# Patient Record
Sex: MALE | Race: NATIVE HAWAIIAN OR OTHER PACIFIC ISLANDER | HISPANIC OR LATINO
[De-identification: names, ages, dates, MRNs, and addresses within clinical notes are randomized per-mention and may not be internally consistent; named-entity substitution may affect disease eponyms.]

---

## 2020-05-28 ENCOUNTER — APPOINTMENT (OUTPATIENT)
Dept: UROLOGY | Facility: CLINIC | Age: 50
End: 2020-05-28
Payer: SELF-PAY

## 2020-05-28 VITALS
HEART RATE: 63 BPM | BODY MASS INDEX: 27.72 KG/M2 | DIASTOLIC BLOOD PRESSURE: 84 MMHG | HEIGHT: 71 IN | WEIGHT: 198 LBS | SYSTOLIC BLOOD PRESSURE: 143 MMHG | OXYGEN SATURATION: 97 %

## 2020-05-28 DIAGNOSIS — Z80.1 FAMILY HISTORY OF MALIGNANT NEOPLASM OF TRACHEA, BRONCHUS AND LUNG: ICD-10-CM

## 2020-05-28 DIAGNOSIS — Z72.0 TOBACCO USE: ICD-10-CM

## 2020-05-28 DIAGNOSIS — Z86.69 PERSONAL HISTORY OF OTHER DISEASES OF THE NERVOUS SYSTEM AND SENSE ORGANS: ICD-10-CM

## 2020-05-28 PROCEDURE — 99204 OFFICE O/P NEW MOD 45 MIN: CPT

## 2020-05-28 NOTE — PHYSICAL EXAM
[General Appearance - Well Developed] : well developed [Normal Appearance] : normal appearance [Well Groomed] : well groomed [General Appearance - Well Nourished] : well nourished [General Appearance - In No Acute Distress] : no acute distress [Edema] : no peripheral edema [Exaggerated Use Of Accessory Muscles For Inspiration] : no accessory muscle use [Respiration, Rhythm And Depth] : normal respiratory rhythm and effort [Costovertebral Angle Tenderness] : no ~M costovertebral angle tenderness [Abdomen Soft] : soft [Abdomen Tenderness] : non-tender [Urinary Bladder Findings] : the bladder was normal on palpation [Urethral Meatus] : meatus normal [Testes Mass (___cm)] : there were no testicular masses [No Prostate Nodules] : no prostate nodules [Scrotum] : the scrotum was normal [FreeTextEntry1] : 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  Prostate is small and palpably benign [Normal Station and Gait] : the gait and station were normal for the patient's age [] : no rash [Oriented To Time, Place, And Person] : oriented to person, place, and time [No Focal Deficits] : no focal deficits [Affect] : the affect was normal [Not Anxious] : not anxious [Mood] : the mood was normal [No Palpable Adenopathy] : no palpable adenopathy

## 2020-05-28 NOTE — HISTORY OF PRESENT ILLNESS
[FreeTextEntry1] : This patient is here with his wife.  They state that he has had difficulty maintaining an erection for elective time for the past 5 years.  He does not ejaculate prior to the lessening of the erection.  More recently his penis is started to deviate to the left with erection with some pain noted.  He denies other urinary tract problems.

## 2020-05-28 NOTE — END OF VISIT
[FreeTextEntry3] : Labs are sent and he will followup with a DUPLEX scan of the penis.  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

## 2020-06-09 ENCOUNTER — APPOINTMENT (OUTPATIENT)
Dept: UROLOGY | Facility: CLINIC | Age: 50
End: 2020-06-09

## 2020-07-20 ENCOUNTER — APPOINTMENT (OUTPATIENT)
Dept: NEUROLOGY | Facility: CLINIC | Age: 50
End: 2020-07-20

## 2020-10-12 ENCOUNTER — APPOINTMENT (OUTPATIENT)
Dept: OTOLARYNGOLOGY | Facility: CLINIC | Age: 50
End: 2020-10-12
Payer: MEDICAID

## 2020-10-12 VITALS
BODY MASS INDEX: 27.09 KG/M2 | DIASTOLIC BLOOD PRESSURE: 78 MMHG | HEIGHT: 72 IN | WEIGHT: 200 LBS | SYSTOLIC BLOOD PRESSURE: 142 MMHG | HEART RATE: 63 BPM | TEMPERATURE: 97.6 F

## 2020-10-12 DIAGNOSIS — H90.3 SENSORINEURAL HEARING LOSS, BILATERAL: ICD-10-CM

## 2020-10-12 DIAGNOSIS — F17.210 NICOTINE DEPENDENCE, CIGARETTES, UNCOMPLICATED: ICD-10-CM

## 2020-10-12 DIAGNOSIS — H92.01 OTALGIA, RIGHT EAR: ICD-10-CM

## 2020-10-12 DIAGNOSIS — Z83.3 FAMILY HISTORY OF DIABETES MELLITUS: ICD-10-CM

## 2020-10-12 DIAGNOSIS — M26.609 UNSPECIFIED TEMPOROMANDIBULAR JOINT DISORDER: ICD-10-CM

## 2020-10-12 DIAGNOSIS — E78.00 PURE HYPERCHOLESTEROLEMIA, UNSPECIFIED: ICD-10-CM

## 2020-10-12 PROCEDURE — 99204 OFFICE O/P NEW MOD 45 MIN: CPT | Mod: 25

## 2020-10-12 PROCEDURE — 92567 TYMPANOMETRY: CPT

## 2020-10-12 PROCEDURE — 92557 COMPREHENSIVE HEARING TEST: CPT

## 2020-10-12 RX ORDER — ESCITALOPRAM OXALATE 10 MG/1
10 TABLET ORAL
Refills: 0 | Status: ACTIVE | COMMUNITY

## 2020-10-12 RX ORDER — CIPROFLOXACIN HYDROCHLORIDE 500 MG/1
500 TABLET, FILM COATED ORAL
Refills: 0 | Status: ACTIVE | COMMUNITY

## 2020-10-12 NOTE — REVIEW OF SYSTEMS
[Dizziness] : dizziness [Vertigo] : vertigo [Lightheadedness] : lightheadedness [Recurrent Sinus Infections] : recurrent sinus infections [Snoring With Pauses] : snoring with pauses [Throat Dryness] : throat dryness [Throat Itching] : throat itching [Anxiety] : anxiety [Negative] : Heme/Lymph [FreeTextEntry1] : headache  chills  reflux  passing out   sweating at night

## 2020-10-12 NOTE — PHYSICAL EXAM
[Midline] : trachea located in midline position [Edentulous] : edentulous [Normal] : no rashes [de-identified] : tender right tmj  [de-identified] : normal after removal of denture

## 2020-10-12 NOTE — HISTORY OF PRESENT ILLNESS
[de-identified] : c/o left ear ache last week - now c/o right ear discomfort.  No prior evaluation. No prior ear problems.  Feels pain in back of head.  ? decreased hearing.  Does use q tips.

## 2020-10-12 NOTE — ASSESSMENT
[FreeTextEntry1] : Patient with right otalgia and tender right tmj.  Ear exam unremarkable.  Patient with bilateral snhl and a tymp and only wears left hearing aide.  Feel problem with pain due to tmj and recommended warm compresses and NSAIDS.  Patient also advised that based on audio he should consider bilateral hearing aides.  Follow up in several mos and as necessary

## 2020-10-13 ENCOUNTER — APPOINTMENT (OUTPATIENT)
Dept: UROLOGY | Facility: CLINIC | Age: 50
End: 2020-10-13
Payer: MEDICAID

## 2020-10-27 ENCOUNTER — APPOINTMENT (OUTPATIENT)
Dept: UROLOGY | Facility: CLINIC | Age: 50
End: 2020-10-27
Payer: MEDICAID

## 2020-10-27 VITALS
DIASTOLIC BLOOD PRESSURE: 80 MMHG | SYSTOLIC BLOOD PRESSURE: 147 MMHG | BODY MASS INDEX: 27.77 KG/M2 | HEIGHT: 72 IN | WEIGHT: 205 LBS | OXYGEN SATURATION: 98 % | HEART RATE: 64 BPM | TEMPERATURE: 97.6 F

## 2020-10-27 PROCEDURE — 99213 OFFICE O/P EST LOW 20 MIN: CPT

## 2020-10-27 PROCEDURE — 99072 ADDL SUPL MATRL&STAF TM PHE: CPT

## 2020-10-27 RX ORDER — CIPROFLOXACIN HYDROCHLORIDE 500 MG/1
500 TABLET, FILM COATED ORAL
Qty: 20 | Refills: 0 | Status: ACTIVE | COMMUNITY
Start: 2020-10-27 | End: 1900-01-01

## 2020-10-27 NOTE — HISTORY OF PRESENT ILLNESS
[FreeTextEntry1] : This patient is here with his spouse.  His private doctor sent him over with a PSA elevation.  He has not had numbers to this level in the past.  He has some mild lower tract symptoms

## 2020-10-27 NOTE — PHYSICAL EXAM
[General Appearance - Well Developed] : well developed [General Appearance - Well Nourished] : well nourished [Normal Appearance] : normal appearance [Well Groomed] : well groomed [General Appearance - In No Acute Distress] : no acute distress [Abdomen Soft] : soft [Abdomen Tenderness] : non-tender [Costovertebral Angle Tenderness] : no ~M costovertebral angle tenderness [Urethral Meatus] : meatus normal [Urinary Bladder Findings] : the bladder was normal on palpation [Scrotum] : the scrotum was normal [Testes Mass (___cm)] : there were no testicular masses [No Prostate Nodules] : no prostate nodules [FreeTextEntry1] : 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  Prostate is small and palpably benign [Edema] : no peripheral edema [] : no respiratory distress [Respiration, Rhythm And Depth] : normal respiratory rhythm and effort [Exaggerated Use Of Accessory Muscles For Inspiration] : no accessory muscle use [Oriented To Time, Place, And Person] : oriented to person, place, and time [Affect] : the affect was normal [Mood] : the mood was normal [Not Anxious] : not anxious [Normal Station and Gait] : the gait and station were normal for the patient's age [No Focal Deficits] : no focal deficits [No Palpable Adenopathy] : no palpable adenopathy

## 2020-10-27 NOTE — END OF VISIT
[FreeTextEntry3] : He will take a course of ciprofloxacin with a repeat PSA and a follow-up transrectal ultrasound of the prostate gland in 3 weeks

## 2020-11-17 ENCOUNTER — APPOINTMENT (OUTPATIENT)
Dept: UROLOGY | Facility: CLINIC | Age: 50
End: 2020-11-17
Payer: MEDICAID

## 2020-11-17 DIAGNOSIS — R97.20 ELEVATED PROSTATE, SPECIFIC ANTIGEN [PSA]: ICD-10-CM

## 2020-11-17 DIAGNOSIS — N40.0 BENIGN PROSTATIC HYPERPLASIA WITHOUT LOWER URINARY TRACT SYMPMS: ICD-10-CM

## 2020-11-17 PROCEDURE — 99213 OFFICE O/P EST LOW 20 MIN: CPT | Mod: 25

## 2020-11-17 PROCEDURE — 51741 ELECTRO-UROFLOWMETRY FIRST: CPT

## 2020-11-17 PROCEDURE — 99072 ADDL SUPL MATRL&STAF TM PHE: CPT

## 2020-11-17 PROCEDURE — 76857 US EXAM PELVIC LIMITED: CPT

## 2020-11-17 PROCEDURE — 76872 US TRANSRECTAL: CPT

## 2020-11-17 RX ORDER — SILDENAFIL 20 MG/1
20 TABLET ORAL
Qty: 90 | Refills: 11 | Status: ACTIVE | COMMUNITY
Start: 2020-11-17 | End: 1900-01-01

## 2020-11-17 NOTE — END OF VISIT
[FreeTextEntry3] : A testosterone is sent and I prescribe sildenafil 20 mg with full instructions.  He will follow-up here with a duplex scan of the penis

## 2020-11-17 NOTE — HISTORY OF PRESENT ILLNESS
[FreeTextEntry1] : This patient is here today for a transrectal ultrasound of the prostate gland but his spouse brings up the subject of erectile dysfunction which apparently has been a problem for some time.

## 2020-12-10 ENCOUNTER — APPOINTMENT (OUTPATIENT)
Dept: UROLOGY | Facility: CLINIC | Age: 50
End: 2020-12-10
Payer: MEDICAID

## 2020-12-10 VITALS
BODY MASS INDEX: 28.84 KG/M2 | HEIGHT: 71 IN | SYSTOLIC BLOOD PRESSURE: 162 MMHG | HEART RATE: 88 BPM | OXYGEN SATURATION: 96 % | DIASTOLIC BLOOD PRESSURE: 84 MMHG | WEIGHT: 206 LBS

## 2020-12-10 PROCEDURE — 99072 ADDL SUPL MATRL&STAF TM PHE: CPT

## 2020-12-10 PROCEDURE — 54235 NJX CORPORA CAVERNOSA RX AGT: CPT

## 2020-12-10 PROCEDURE — 93980 PENILE VASCULAR STUDY: CPT

## 2020-12-18 ENCOUNTER — NON-APPOINTMENT (OUTPATIENT)
Age: 50
End: 2020-12-18

## 2021-01-12 ENCOUNTER — APPOINTMENT (OUTPATIENT)
Dept: UROLOGY | Facility: CLINIC | Age: 51
End: 2021-01-12
Payer: MEDICAID

## 2021-01-12 PROCEDURE — 99215 OFFICE O/P EST HI 40 MIN: CPT

## 2021-01-12 PROCEDURE — 99072 ADDL SUPL MATRL&STAF TM PHE: CPT

## 2021-01-12 NOTE — HISTORY OF PRESENT ILLNESS
[FreeTextEntry1] : Patient is a 50-year-old gentleman who presents with his stepson with a chief complaint of erectile dysfunction and penile curvature. He states that this has been present for the past several years. It causes both he and his partner great stress.  I reviewed the questionnaire he completed in detail His erections are not modified with the degree of sexual stimulation.   He states that his erections presently are often less than 5 out of 10 in both tumescence and rigidity, insufficient for penetration.   He often ejaculates through a flaccid phallus.  He has difficulty maintaining an erection. He describes a normal libido.  His sexual dysfunction occurs with both sexual relations and masturbation.  His erections are somewhat improved with PDE5 inhibitors.    His partner is understanding and was unable to be with him at the visit today. He is not  and has adult children.  \par \par The patient denies fevers, chills, nausea and/or vomiting and no unexplained weight loss.  His past medical history is non-contributory.  In his present occupation he has no known toxin exposure. He does not smoke and drinks socially.  He has no known drug allergies. His review of systems and past medical history demonstrates no significant urologic issues (see patient completed questionnaire). His family history demonstrates no significant urologic issues.

## 2021-01-12 NOTE — PHYSICAL EXAM
[Penis Abnormality] : normal uncircumcised penis [FreeTextEntry1] : Dense dorsal plaque in the midshaft of the phallus approximately 3 x 2 cm

## 2021-01-12 NOTE — ASSESSMENT
[FreeTextEntry1] : This pleasant gentleman presents for evaluation of his sexual dysfunction.  He has a dense dorsal plaque in the midshaft of the phallus approximately 3 x 2 cm I have requested several blood studies.  Urine dip and microscopic analysis was requested.  I will make further recommendations when the results return. I have suggested a diagnostic injection and duplex ultrasound to evaluate his penile vasculature. We discussed his evaluation today and possible options for therapy depending upon the results of his testing.  I will be better able to make more specific recommendations after additional test results return. \par 1. Review blood tests on follow up\par 2. Penile duplex study on follow up\par \par Consultation: 40 minutes:  20 minutes reviewing his history and performing a physical examination.  20 minutes reviewing the plan penile duplex ultrasound exam, writing for prescription medications and blood studies ,discussing treatment options and writing his note. There was also additional time in preparation for today's visit.\par

## 2021-01-13 LAB
25(OH)D3 SERPL-MCNC: 20.4 NG/ML
ALBUMIN SERPL ELPH-MCNC: 4.8 G/DL
ALP BLD-CCNC: 70 U/L
ALT SERPL-CCNC: 34 U/L
ANION GAP SERPL CALC-SCNC: 13 MMOL/L
AST SERPL-CCNC: 25 U/L
BASOPHILS # BLD AUTO: 0.05 K/UL
BASOPHILS NFR BLD AUTO: 0.6 %
BILIRUB SERPL-MCNC: 0.3 MG/DL
BUN SERPL-MCNC: 10 MG/DL
CALCIUM SERPL-MCNC: 9.9 MG/DL
CHLORIDE SERPL-SCNC: 107 MMOL/L
CO2 SERPL-SCNC: 24 MMOL/L
CREAT SERPL-MCNC: 1.16 MG/DL
EOSINOPHIL # BLD AUTO: 0.16 K/UL
EOSINOPHIL NFR BLD AUTO: 1.8 %
ESTIMATED AVERAGE GLUCOSE: 120 MG/DL
GLUCOSE SERPL-MCNC: 86 MG/DL
HBA1C MFR BLD HPLC: 5.8 %
HCT VFR BLD CALC: 46.3 %
HGB BLD-MCNC: 15.2 G/DL
IMM GRANULOCYTES NFR BLD AUTO: 0.4 %
LH SERPL-ACNC: 5.2 IU/L
LYMPHOCYTES # BLD AUTO: 3.37 K/UL
LYMPHOCYTES NFR BLD AUTO: 37.7 %
MAN DIFF?: NORMAL
MCHC RBC-ENTMCNC: 29.8 PG
MCHC RBC-ENTMCNC: 32.8 GM/DL
MCV RBC AUTO: 90.8 FL
MONOCYTES # BLD AUTO: 0.77 K/UL
MONOCYTES NFR BLD AUTO: 8.6 %
NEUTROPHILS # BLD AUTO: 4.55 K/UL
NEUTROPHILS NFR BLD AUTO: 50.9 %
PLATELET # BLD AUTO: 235 K/UL
POTASSIUM SERPL-SCNC: 4.4 MMOL/L
PROLACTIN SERPL-MCNC: 10.8 NG/ML
PROT SERPL-MCNC: 7 G/DL
PSA SERPL-MCNC: 2.88 NG/ML
RBC # BLD: 5.1 M/UL
RBC # FLD: 14 %
SODIUM SERPL-SCNC: 145 MMOL/L
TSH SERPL-ACNC: 2.03 UIU/ML
WBC # FLD AUTO: 8.94 K/UL

## 2021-01-19 LAB
TESTOST BND SERPL-MCNC: 8.4 PG/ML
TESTOST SERPL-MCNC: 260.4 NG/DL

## 2021-02-09 ENCOUNTER — APPOINTMENT (OUTPATIENT)
Dept: UROLOGY | Facility: CLINIC | Age: 51
End: 2021-02-09
Payer: MEDICAID

## 2021-02-09 ENCOUNTER — OUTPATIENT (OUTPATIENT)
Dept: OUTPATIENT SERVICES | Facility: HOSPITAL | Age: 51
LOS: 1 days | End: 2021-02-09
Payer: MEDICAID

## 2021-02-09 DIAGNOSIS — N52.9 MALE ERECTILE DYSFUNCTION, UNSPECIFIED: ICD-10-CM

## 2021-02-09 DIAGNOSIS — R35.0 FREQUENCY OF MICTURITION: ICD-10-CM

## 2021-02-09 PROCEDURE — 99072 ADDL SUPL MATRL&STAF TM PHE: CPT

## 2021-02-09 PROCEDURE — 99214 OFFICE O/P EST MOD 30 MIN: CPT | Mod: 25

## 2021-02-09 PROCEDURE — 93980 PENILE VASCULAR STUDY: CPT | Mod: 26

## 2021-02-09 PROCEDURE — 54235 NJX CORPORA CAVERNOSA RX AGT: CPT

## 2021-02-09 PROCEDURE — 93980 PENILE VASCULAR STUDY: CPT

## 2021-02-09 NOTE — ASSESSMENT
[FreeTextEntry1] : The patient returns for follow-up to review his recent blood studies and to discuss options for therapy.  He has a dense dorsal plaque in the midshaft of the phallus approximately 3 x 2 cm I have requested several blood studies.  Hold to 3 blood studies demonstrated a hemoglobin A1c of 5.8% his PSA was 2.88 ng/mL.  Vitamin D 25 was 20.4 ng/mL slightly low.  We discussed supplementation.  His testosterone was also at the lower limits of 260 ng/dL with a normal free testosterone of 8.4 pg/mL.\par \par Duplex ultrasound study demonstrated significant arterial genic dysfunction with dorsal penile curvature of 85 degrees located 3 cm from the glans.  Medical evaluation was requested.  His wife was present through the discussion and we used a .  We discussed several options options for treatment of his penile curvature including surgical and injectable therapies.  We discussed at length the use of Xiaflex for treatment of his penile curvature.  We discussed realistic expectations.  He understands the possibility for penile edema, penile ecchymosis and penile fracture.  He has questions which were answered to his satisfaction.  He would like to proceed with Xiaflex therapy.  Financial paperwork was completed by the patient and we will see him back for Xiaflex therapy after approval.\par \par \par Consultation: 30 minutes:  10 minutes reviewing his history and performing a physical examination.  20 minutes reviewing the ultrasound and discussing Xiaflex therapy,discussing treatment options and writing his note. There was also additional time in preparation for today's visit.\par

## 2021-02-09 NOTE — HISTORY OF PRESENT ILLNESS
[FreeTextEntry1] : The patient returns for follow-up to review his recent blood studies and to discuss options for therapy.\par \par PMH: Patient initially presented with his stepson with a chief complaint of erectile dysfunction and penile curvature. He states that this has been present for the past several years. It causes both he and his partner great stress.  I reviewed the questionnaire he completed in detail His erections are not modified with the degree of sexual stimulation.   He states that his erections presently are often less than 5 out of 10 in both tumescence and rigidity, insufficient for penetration.   He often ejaculates through a flaccid phallus.  He has difficulty maintaining an erection. He describes a normal libido.  His sexual dysfunction occurs with both sexual relations and masturbation.  His erections are somewhat improved with PDE5 inhibitors.    His partner is understanding and was unable to be with him at the visit today. He is not  and has adult children.  \par \par The patient denies fevers, chills, nausea and/or vomiting and no unexplained weight loss.  His past medical history is non-contributory.  In his present occupation he has no known toxin exposure. He does not smoke and drinks socially.  He has no known drug allergies. His review of systems and past medical history demonstrates no significant urologic issues (see patient completed questionnaire). His family history demonstrates no significant urologic issues.

## 2021-02-09 NOTE — PHYSICAL EXAM
[General Appearance - Well Developed] : well developed [General Appearance - Well Nourished] : well nourished [Normal Appearance] : normal appearance [Well Groomed] : well groomed [General Appearance - In No Acute Distress] : no acute distress [Abdomen Soft] : soft [Urethral Meatus] : meatus normal [Scrotum] : the scrotum was normal [Urinary Bladder Findings] : the bladder was normal on palpation [Testes Mass (___cm)] : there were no testicular masses [Skin Turgor] : supple [Oriented To Time, Place, And Person] : oriented to person, place, and time [Affect] : the affect was normal [Not Anxious] : not anxious [Mood] : the mood was normal [Normal Station and Gait] : the gait and station were normal for the patient's age

## 2021-02-11 DIAGNOSIS — N52.9 MALE ERECTILE DYSFUNCTION, UNSPECIFIED: ICD-10-CM

## 2021-02-11 DIAGNOSIS — N48.6 INDURATION PENIS PLASTICA: ICD-10-CM

## 2021-04-06 ENCOUNTER — APPOINTMENT (OUTPATIENT)
Dept: UROLOGY | Facility: CLINIC | Age: 51
End: 2021-04-06
Payer: MEDICAID

## 2021-04-06 ENCOUNTER — TRANSCRIPTION ENCOUNTER (OUTPATIENT)
Age: 51
End: 2021-04-06

## 2021-04-06 ENCOUNTER — OUTPATIENT (OUTPATIENT)
Dept: OUTPATIENT SERVICES | Facility: HOSPITAL | Age: 51
LOS: 1 days | End: 2021-04-06
Payer: MEDICAID

## 2021-04-06 VITALS
HEART RATE: 75 BPM | RESPIRATION RATE: 16 BRPM | SYSTOLIC BLOOD PRESSURE: 159 MMHG | DIASTOLIC BLOOD PRESSURE: 77 MMHG | TEMPERATURE: 97 F

## 2021-04-06 VITALS — HEART RATE: 58 BPM | RESPIRATION RATE: 16 BRPM | SYSTOLIC BLOOD PRESSURE: 152 MMHG | DIASTOLIC BLOOD PRESSURE: 87 MMHG

## 2021-04-06 DIAGNOSIS — R35.0 FREQUENCY OF MICTURITION: ICD-10-CM

## 2021-04-06 PROCEDURE — 54200 INJECTION PX PEYRONIE DS: CPT

## 2021-04-06 RX ORDER — COLLAGENASE CLOSTRIDIUM HISTOLYTICUM 0.9 MG
0.9 KIT INJECTION
Refills: 0 | Status: COMPLETED | OUTPATIENT
Start: 2021-04-06

## 2021-04-06 RX ORDER — COLLAGENASE CLOSTRIDIUM HISTOLYTICUM 0.9 MG
0.9 KIT INJECTION
Qty: 0.58 | Refills: 0 | Status: COMPLETED | OUTPATIENT
Start: 2021-04-06 | End: 2021-04-06

## 2021-04-06 RX ADMIN — COLLAGENASE CLOSTRIDIUM HISTOLYTICUM 0 MG: KIT at 00:00

## 2021-04-08 ENCOUNTER — OUTPATIENT (OUTPATIENT)
Dept: OUTPATIENT SERVICES | Facility: HOSPITAL | Age: 51
LOS: 1 days | End: 2021-04-08
Payer: MEDICAID

## 2021-04-08 ENCOUNTER — APPOINTMENT (OUTPATIENT)
Dept: UROLOGY | Facility: CLINIC | Age: 51
End: 2021-04-08
Payer: MEDICAID

## 2021-04-08 VITALS
DIASTOLIC BLOOD PRESSURE: 81 MMHG | RESPIRATION RATE: 16 BRPM | TEMPERATURE: 97.6 F | SYSTOLIC BLOOD PRESSURE: 163 MMHG | HEART RATE: 60 BPM

## 2021-04-08 DIAGNOSIS — N48.6 INDURATION PENIS PLASTICA: ICD-10-CM

## 2021-04-08 DIAGNOSIS — R35.0 FREQUENCY OF MICTURITION: ICD-10-CM

## 2021-04-08 PROCEDURE — 54200 INJECTION PX PEYRONIE DS: CPT | Mod: 58

## 2021-04-08 PROCEDURE — 54200 INJECTION PX PEYRONIE DS: CPT

## 2021-04-08 RX ORDER — COLLAGENASE CLOSTRIDIUM HISTOLYTICUM 0.9 MG
0.9 KIT INJECTION
Refills: 0 | Status: COMPLETED | OUTPATIENT
Start: 2021-04-08

## 2021-04-08 RX ORDER — COLLAGENASE CLOSTRIDIUM HISTOLYTICUM 0.9 MG
0.9 KIT INJECTION
Qty: 0.58 | Refills: 0 | Status: COMPLETED | OUTPATIENT
Start: 2021-04-08 | End: 2021-04-08

## 2021-04-08 RX ADMIN — COLLAGENASE CLOSTRIDIUM HISTOLYTICUM 0 MG: KIT at 00:00

## 2021-04-26 ENCOUNTER — APPOINTMENT (OUTPATIENT)
Dept: UROLOGY | Facility: CLINIC | Age: 51
End: 2021-04-26
Payer: MEDICAID

## 2021-04-26 DIAGNOSIS — N52.9 MALE ERECTILE DYSFUNCTION, UNSPECIFIED: ICD-10-CM

## 2021-04-26 DIAGNOSIS — N48.6 INDURATION PENIS PLASTICA: ICD-10-CM

## 2021-04-26 PROCEDURE — 99214 OFFICE O/P EST MOD 30 MIN: CPT | Mod: 95

## 2021-04-26 RX ORDER — PAPAVERINE HYDROCHLORIDE 30 MG/ML
30 INJECTION, SOLUTION INTRAVENOUS
Qty: 2 | Refills: 0 | Status: ACTIVE | COMMUNITY
Start: 2021-01-12 | End: 1900-01-01

## 2021-04-26 NOTE — HISTORY OF PRESENT ILLNESS
[FreeTextEntry1] : The patient-doctor. relationship has been established in a face-to-face fashion on real-time video audio HIPAA compliant communication using telemedicine software.  The patient's identity has been confirmed.  The patient was previously emailed a copy of the telemedicine consent.  The patient has had a chance to review and has now given verbal consent and has requested care to be assessed and treated through telemedicine. The patient understands there may be limitations in this process and that they need not need further follow-up care in the office and/or hospital settings. We were unable to use the [employer] platform and an alternative platform was utilized.\par \par Verbal consent was given on Monday, April 26, 2021 at 11:30 AM by the patient.  It was requested by the physician.  A written consent was previously sent for the patient to sign and return.\par \par The patient returns for telehealth appointment.  He has been doing his stretching exercises after completing his first cycle of Xiaflex.  He is doing well and has no complaints.  He also wanted to discuss use of sildenafil.\par \par PMH: Patient initially presented with his stepson with a chief complaint of erectile dysfunction and penile curvature. He states that this has been present for the past several years. It causes both he and his partner great stress.  I reviewed the questionnaire he completed in detail His erections are not modified with the degree of sexual stimulation.   He states that his erections presently are often less than 5 out of 10 in both tumescence and rigidity, insufficient for penetration.   He often ejaculates through a flaccid phallus.  He has difficulty maintaining an erection. He describes a normal libido.  His sexual dysfunction occurs with both sexual relations and masturbation.  His erections are somewhat improved with PDE5 inhibitors.    His partner is understanding and was unable to be with him at the visit today. He is not  and has adult children.  \par \par The patient denies fevers, chills, nausea and/or vomiting and no unexplained weight loss.  His past medical history is non-contributory.  In his present occupation he has no known toxin exposure. He does not smoke and drinks socially.  He has no known drug allergies. His review of systems and past medical history demonstrates no significant urologic issues (see patient completed questionnaire). His family history demonstrates no significant urologic issues.

## 2021-04-26 NOTE — ASSESSMENT
[FreeTextEntry1] : The patient returns for telehealth appointment.  He has been doing his stretching exercises after completing his first cycle of Xiaflex.  He is doing well and has no complaints.  He also wanted to discuss use of sildenafil..\par \par We discussed his next cycle of Xiaflex in approximately 4 weeks.  He knows not to have relations for an additional 2 weeks from now.  I have ordered the Trimix medication which he will  and bring with him.  He will be keeping it refrigerated.  We also discussed the subsequent Xiaflex cycles and what will be done on each day.\par \par We reviewed the use of sildenafil to be taken an hour and a half prior to relations.  He has been taking 60 mg and have asked him to go up to 100 mg.  He will try that and we will review on his follow-up visit.  He will call with any questions.\par \par Telehealth Consultation: 30 minutes  10 minutes reviewing his history and discussing prior results.  20 minutes discussing various treatment options, writing medication prescriptions and writing his note. There was also additional time in preparing for the visit and assisting the patient with technology issues he was having with the telehealth platform.\par

## 2021-07-01 ENCOUNTER — TRANSCRIPTION ENCOUNTER (OUTPATIENT)
Age: 51
End: 2021-07-01

## 2022-11-25 ENCOUNTER — APPOINTMENT (OUTPATIENT)
Dept: NEUROLOGY | Facility: CLINIC | Age: 52
End: 2022-11-25

## 2023-09-01 ENCOUNTER — APPOINTMENT (OUTPATIENT)
Dept: VASCULAR SURGERY | Facility: CLINIC | Age: 53
End: 2023-09-01
Payer: COMMERCIAL

## 2023-09-01 VITALS
RESPIRATION RATE: 16 BRPM | DIASTOLIC BLOOD PRESSURE: 62 MMHG | TEMPERATURE: 98 F | OXYGEN SATURATION: 95 % | HEIGHT: 70 IN | BODY MASS INDEX: 28.92 KG/M2 | WEIGHT: 202 LBS | HEART RATE: 65 BPM | SYSTOLIC BLOOD PRESSURE: 122 MMHG

## 2023-09-01 DIAGNOSIS — M79.89 OTHER SPECIFIED SOFT TISSUE DISORDERS: ICD-10-CM

## 2023-09-01 PROCEDURE — 93970 EXTREMITY STUDY: CPT

## 2023-09-01 PROCEDURE — 99203 OFFICE O/P NEW LOW 30 MIN: CPT

## 2023-09-01 NOTE — HISTORY OF PRESENT ILLNESS
[FreeTextEntry1] : 52-year-old male with progressive swelling initially of his left lower extremity and subsequently his right lower extremity over the past month.  He denies shortness of breath or chest pain.  He can ambulate up multiple flights of stairs and is active all day.  He has no personal history of injuries to either lower extremity.  No family history of lymphedema that he is aware of.  He had undergone a previous venous duplex which showed no evidence of deep vein thrombosis bilaterally

## 2023-09-01 NOTE — PHYSICAL EXAM
[2+] : left 2+ [de-identified] : Appears well in no acute distress. [de-identified] : Scant edema bilateral feet and ankles.

## 2023-09-01 NOTE — ASSESSMENT
[FreeTextEntry1] : 52-year-old male with progressive foot and ankle swelling.  Ultrasound shows no evidence of significant reflux no evidence of DVT.  Suggest other etiologies for swelling.'s do not believe it is secondary to cardiac etiologies.  Patient advised to see his primary care physician for further work-up.

## 2025-06-19 ENCOUNTER — APPOINTMENT (OUTPATIENT)
Dept: UROLOGY | Facility: CLINIC | Age: 55
End: 2025-06-19
Payer: MEDICAID

## 2025-06-19 VITALS
BODY MASS INDEX: 28.44 KG/M2 | WEIGHT: 210 LBS | DIASTOLIC BLOOD PRESSURE: 81 MMHG | RESPIRATION RATE: 16 BRPM | HEART RATE: 73 BPM | OXYGEN SATURATION: 97 % | HEIGHT: 72 IN | SYSTOLIC BLOOD PRESSURE: 147 MMHG

## 2025-06-19 PROCEDURE — 99204 OFFICE O/P NEW MOD 45 MIN: CPT

## 2025-06-19 RX ORDER — SULFAMETHOXAZOLE AND TRIMETHOPRIM 800; 160 MG/1; MG/1
800-160 TABLET ORAL TWICE DAILY
Qty: 20 | Refills: 0 | Status: ACTIVE | COMMUNITY
Start: 2025-06-19 | End: 1900-01-01

## 2025-06-19 RX ORDER — TAMSULOSIN HYDROCHLORIDE 0.4 MG/1
0.4 CAPSULE ORAL
Qty: 60 | Refills: 11 | Status: ACTIVE | COMMUNITY
Start: 2025-06-19 | End: 1900-01-01

## 2025-07-11 ENCOUNTER — APPOINTMENT (OUTPATIENT)
Dept: UROLOGY | Facility: CLINIC | Age: 55
End: 2025-07-11
Payer: MEDICAID

## 2025-07-11 VITALS
HEART RATE: 67 BPM | SYSTOLIC BLOOD PRESSURE: 116 MMHG | OXYGEN SATURATION: 95 % | BODY MASS INDEX: 27.77 KG/M2 | TEMPERATURE: 97.8 F | RESPIRATION RATE: 15 BRPM | HEIGHT: 72 IN | DIASTOLIC BLOOD PRESSURE: 72 MMHG | WEIGHT: 205 LBS

## 2025-07-11 PROBLEM — N40.1 BPH WITH URINARY OBSTRUCTION: Status: ACTIVE | Noted: 2025-06-19

## 2025-07-11 PROCEDURE — 76872 US TRANSRECTAL: CPT

## 2025-07-12 LAB
PSA FREE FLD-MCNC: 11 %
PSA FREE SERPL-MCNC: 0.42 NG/ML
PSA SERPL-MCNC: 3.82 NG/ML

## 2025-07-14 ENCOUNTER — RX CHANGE (OUTPATIENT)
Age: 55
End: 2025-07-14

## 2025-07-14 RX ORDER — TAMSULOSIN HYDROCHLORIDE 0.4 MG/1
0.4 CAPSULE ORAL
Qty: 180 | Refills: 3 | Status: ACTIVE | COMMUNITY
Start: 1900-01-01 | End: 1900-01-01

## 2025-08-12 ENCOUNTER — APPOINTMENT (OUTPATIENT)
Dept: VASCULAR SURGERY | Facility: CLINIC | Age: 55
End: 2025-08-12